# Patient Record
Sex: FEMALE | Race: BLACK OR AFRICAN AMERICAN | Employment: FULL TIME | URBAN - METROPOLITAN AREA
[De-identification: names, ages, dates, MRNs, and addresses within clinical notes are randomized per-mention and may not be internally consistent; named-entity substitution may affect disease eponyms.]

---

## 2017-01-24 ENCOUNTER — OFFICE VISIT (OUTPATIENT)
Dept: NEUROLOGY | Age: 52
End: 2017-01-24

## 2017-01-24 VITALS
OXYGEN SATURATION: 98 % | WEIGHT: 230 LBS | SYSTOLIC BLOOD PRESSURE: 130 MMHG | BODY MASS INDEX: 40.75 KG/M2 | HEART RATE: 82 BPM | HEIGHT: 63 IN | DIASTOLIC BLOOD PRESSURE: 70 MMHG

## 2017-01-24 DIAGNOSIS — G43.009 MIGRAINE WITHOUT AURA AND WITHOUT STATUS MIGRAINOSUS, NOT INTRACTABLE: Primary | ICD-10-CM

## 2017-01-24 RX ORDER — PROCHLORPERAZINE MALEATE 10 MG
TABLET ORAL
Refills: 3 | COMMUNITY
Start: 2017-01-03

## 2017-01-24 RX ORDER — SUMATRIPTAN 100 MG/1
100 TABLET, FILM COATED ORAL
Qty: 9 TAB | Refills: 11 | Status: SHIPPED | OUTPATIENT
Start: 2017-01-24

## 2017-01-24 RX ORDER — DEXAMETHASONE SODIUM PHOSPHATE 10 MG/ML
INJECTION INTRAMUSCULAR; INTRAVENOUS
COMMUNITY

## 2017-01-24 RX ORDER — FOSAPREPITANT 150 MG/5ML
INJECTION, POWDER, LYOPHILIZED, FOR SOLUTION INTRAVENOUS ONCE
COMMUNITY

## 2017-01-24 NOTE — MR AVS SNAPSHOT
Visit Information Date & Time Provider Department Dept. Phone Encounter #  
 1/24/2017 10:30 AM Bryce Andrea NP [facility] [phone] [id number] Follow-up Instructions Return in about 1 year (around 1/24/2018). Upcoming Health Maintenance Date Due  
 PAP AKA CERVICAL CYTOLOGY 1/10/2015 INFLUENZA AGE 9 TO ADULT 8/1/2016 BREAST CANCER SCRN MAMMOGRAM 11/9/2016 FOBT Q 1 YEAR AGE 50-75 11/9/2016 DTaP/Tdap/Td series (2 - Td) 1/25/2022 Allergies as of 1/24/2017  Review Complete On: 1/24/2017 By: Nick Beard LPN No Known Allergies Current Immunizations  Reviewed on 1/25/2012 Name Date Hepatitis B Vaccine 8/1/2002, 2/1/2002, 1/1/2002 Influenza Vaccine Whole 10/1/2011 TDAP Vaccine 1/25/2012 Not reviewed this visit You Were Diagnosed With   
  
 Codes Comments Migraine without aura and without status migrainosus, not intractable    -  Primary ICD-10-CM: G43.009 ICD-9-CM: 346.10 Vitals BP Pulse Height(growth percentile) Weight(growth percentile) SpO2 BMI  
 130/70 82 5' 3\" (1.6 m) 230 lb (104.3 kg) 98% 40.74 kg/m2 OB Status Smoking Status Having regular periods Never Smoker Vitals History BMI and BSA Data Body Mass Index Body Surface Area 40.74 kg/m 2 2.15 m 2 Preferred Pharmacy Pharmacy Name Phone [phone] [facility] [phone] Your Updated Medication List  
  
   
This list is accurate as of: 1/24/17 10:56 AM.  Always use your most recent med list.  
  
  
  
  
 BEVACIZUMAB IV  
by IntraVENous route. dexamethasone (PF) 10 mg/mL injection Commonly known as:  DECADRON  
by IntraMUSCular route. FLUOROURACIL IV  
by IntraVENous route. fosaprepitant 150 mg Solr injection Commonly known as:  EMEND  
by IntraVENous route once. LEUCOVORIN CALCIUM PO Take  by mouth.  
  
 palonosetron (ALOXI) IVPB  
0.25 mg by IntraVENous route once. prochlorperazine 10 mg tablet Commonly known as:  COMPAZINE  
TAKE 1 TABLET BY MOUTH EVERY 4 TO 6 HRS AS NEEDED FOR NAUSEA  
  
 SUMAtriptan 100 mg tablet Commonly known as:  IMITREX Take 1 Tab by mouth once as needed for Migraine. topiramate 50 mg tablet Commonly known as:  TOPAMAX Take 50 mg by mouth every eight (8) hours. Prescriptions Sent to Pharmacy Refills SUMAtriptan (IMITREX) 100 mg tablet 11 Sig: Take 1 Tab by mouth once as needed for Migraine. Class: Normal  
 Pharmacy: [address] #: [phone] Route: Oral  
  
Follow-up Instructions Return in about 1 year (around 1/24/2018). Patient Instructions PRESCRIPTION REFILL POLICY [facility] Statement to Patients April 1, 2014 In an effort to ensure the large volume of patient prescription refills is processed in the most efficient and expeditious manner, we are asking our patients to assist us by calling your Pharmacy for all prescription refills, this will include also your  Mail Order Pharmacy. The pharmacy will contact our office electronically to continue the refill process. Please do not wait until the last minute to call your pharmacy. We need at least 48 hours (2days) to fill prescriptions. We also encourage you to call your pharmacy before going to  your prescription to make sure it is ready. With regard to controlled substance prescription refill requests (narcotic refills) that need to be picked up at our office, we ask your cooperation by providing us with at least 72 hours (3days) notice that you will need a refill.  
 
We will not refill narcotic prescription refill requests after 4:00pm on any weekday, Monday through Thursday, or after 2:00pm on Fridays, or on the weekends. We encourage everyone to explore another way of getting your prescription refill request processed using Salesconx, our patient web portal through our electronic medical record system. STEERadst is an efficient and effective way to communicate your medication request directly to the office and  downloadable as an khushbu on your smart phone . Salesconx also features a review functionality that allows you to view your medication list as well as leave messages for your physician. Are you ready to get connected? If so please review the attatched instructions or speak to any of our staff to get you set up right away! Thank you so much for your cooperation. Should you have any questions please contact our Practice Administrator. The Physicians and Staff,  [facility] A Healthy Lifestyle: Care Instructions Your Care Instructions A healthy lifestyle can help you feel good, stay at a healthy weight, and have plenty of energy for both work and play. A healthy lifestyle is something you can share with your whole family. A healthy lifestyle also can lower your risk for serious health problems, such as high blood pressure, heart disease, and diabetes. You can follow a few steps listed below to improve your health and the health of your family. Follow-up care is a key part of your treatment and safety. Be sure to make and go to all appointments, and call your doctor if you are having problems. Its also a good idea to know your test results and keep a list of the medicines you take. How can you care for yourself at home? · Do not eat too much sugar, fat, or fast foods. You can still have dessert and treats now and then. The goal is moderation. · Start small to improve your eating habits. Pay attention to portion sizes, drink less juice and soda pop, and eat more fruits and vegetables. ¨ Eat a healthy amount of food. A 3-ounce serving of meat, for example, is about the size of a deck of cards. Fill the rest of your plate with vegetables and whole grains. ¨ Limit the amount of soda and sports drinks you have every day. Drink more water when you are thirsty. ¨ Eat at least 5 servings of fruits and vegetables every day. It may seem like a lot, but it is not hard to reach this goal. A serving or helping is 1 piece of fruit, 1 cup of vegetables, or 2 cups of leafy, raw vegetables. Have an apple or some carrot sticks as an afternoon snack instead of a candy bar. Try to have fruits and/or vegetables at every meal. 
· Make exercise part of your daily routine. You may want to start with simple activities, such as walking, bicycling, or slow swimming. Try to be active 30 to 60 minutes every day. You do not need to do all 30 to 60 minutes all at once. For example, you can exercise 3 times a day for 10 or 20 minutes. Moderate exercise is safe for most people, but it is always a good idea to talk to your doctor before starting an exercise program. 
· Keep moving. [place] the lawn, work in the garden, or FiveRuns. Take the stairs instead of the elevator at work. · If you smoke, quit. People who smoke have an increased risk for heart attack, stroke, cancer, and other lung illnesses. Quitting is hard, but there are ways to boost your chance of quitting tobacco for good. ¨ Use nicotine gum, patches, or lozenges. ¨ Ask your doctor about stop-smoking programs and medicines. ¨ Keep trying. In addition to reducing your risk of diseases in the future, you will notice some benefits soon after you stop using tobacco. If you have shortness of breath or asthma symptoms, they will likely get better within a few weeks after you quit. · Limit how much alcohol you drink. Moderate amounts of alcohol (up to 2 drinks a day for men, 1 drink a day for women) are okay.  But drinking too much can lead to liver problems, high blood pressure, and other health problems. Family health If you have a family, there are many things you can do together to improve your health. · Eat meals together as a family as often as possible. · Eat healthy foods. This includes fruits, vegetables, lean meats and dairy, and whole grains. · Include your family in your fitness plan. Most people think of activities such as jogging or tennis as the way to fitness, but there are many ways you and your family can be more active. Anything that makes you breathe hard and gets your heart pumping is exercise. Here are some tips: 
¨ Walk to do errands or to take your child to school or the bus. ¨ Go for a family bike ride after dinner instead of watching TV. Where can you learn more? Go to http://laz-jenna.info/. Enter I936 in the search box to learn more about \"A Healthy Lifestyle: Care Instructions. \" Current as of: July 26, 2016 Content Version: 11.1 © 4460-3156 [employer]. Care instructions adapted under license by [employer] (which disclaims liability or warranty for this information). If you have questions about a medical condition or this instruction, always ask your healthcare professional. [place] [address] any warranty or liability for your use of this information. Introducing [facility]! [facility] introduces Define My Style patient portal. Now you can access parts of your medical record, email your doctor's office, and request medication refills online. 1. In your internet browser, go to [email or web address] 2. Click on the First Time User? Click Here link in the Sign In box. You will see the New Member Sign Up page. 3. Enter your Define My Style Access Code exactly as it appears below. You will not need to use this code after youve completed the sign-up process.  If you do not sign up before the expiration date, you must request a new code. · Language Cloud Access Code: [email or web address] Expires: 4/24/2017 10:13 AM 
 
4. Enter the last four digits of your Social Security Number (xxxx) and Date of Birth (mm/dd/yyyy) as indicated and click Submit. You will be taken to the next sign-up page. 5. Create a Language Cloud ID. This will be your Language Cloud login ID and cannot be changed, so think of one that is secure and easy to remember. 6. Create a Language Cloud password. You can change your password at any time. 7. Enter your Password Reset Question and Answer. This can be used at a later time if you forget your password. 8. Enter your e-mail address. You will receive e-mail notification when new information is available in [address] 9. Click Sign Up. You can now view and download portions of your medical record. 10. Click the Download Summary menu link to download a portable copy of your medical information. If you have questions, please visit the Frequently Asked Questions section of the Language Cloud website. Remember, Language Cloud is NOT to be used for urgent needs. For medical emergencies, dial 911. Now available from your iPhone and Android! Please provide this summary of care documentation to your next provider. Your primary care clinician is listed as [place]. If you have any questions after today's visit, please call [phone].

## 2017-01-24 NOTE — PROGRESS NOTES
Date:            2017    Name:  Zulma Way  :  1965  MRN:  [id number]     PCP:  Chemo Abraham MD    Chief Complaint   Patient presents with    Follow-up    Migraine         HISTORY OF PRESENT ILLNESS:  Suleiman Escalante is a 49 y.o., female who presents today for follow up for migraines. She is a very typical monthly migraine pattern related to her menstrual cycles. She only has migraines a few days prior to her cycle and then another cluster 5-7 days after her cycle. This second cluster will last about 3 days. Never has headaches outside of that pattern. She does not pre-medicate with Imitrex or Aleve. She gets 100 mg imitrex but only take 25 mg at a time. She has tried and failed propranolol and Topamax for preventative therapy. She is currently getting treatment for colon cancer, and is not interested in trying any preventative therapy for her migraines because of all the medications that she is on for that. . Noticed that her Hgb was low at 10.6, her PCP sent her to see Dr Rachel Palomino who started her on iron supplements and got an iron infusion and felt better, but went ahead and contacted her PCP to get a colonoscopy. She had also noticed that she was getting SOB cleaning her house. By the time was discovered that she had colon cancer, she had liver metastases, so she is stage IV. Just started chemo a few weeks ago. 2014 recap  Suleiman Escalante is a 53 y.o. female who I am asked to see in consultation for headaches. She sees Dr. Jose Valle and is here for a second opinion. She was put on topamax. She was told to take 50mg three times per day. She had too much memory loss so now she only takes 50mg at night. At higher doses she also had severe right ovarian pain but this has improved with the decrease in the dose. She also got N/V with it. She also takes imitrex for abortive.     Her migraines start with tension in her shoulder and then goes up to her head.  Always on her left side. Imitrex will stop it. She has no photo/phonophobia. Sometimes nausea. Pain is throbbing and pounding. She gets them three days in a row every month around her period. She will occasionally get them other times of the month too. She will also have the same cycle of three days of migraine during these times too. 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  dexamethasone, PF, (DECADRON) 10 mg/mL injection by IntraMUSCular route.  fosaprepitant (EMEND) 150 mg solr injection by IntraVENous route once.  BEVACIZUMAB IV by IntraVENous route.  LEUCOVORIN CALCIUM PO Take  by mouth.  FLUOROURACIL IV by IntraVENous route.  propranolol LA (INDERAL LA) 80 mg SR capsule TAKE 1 CAPSULE BY MOUTH DAILY    topiramate (TOPAMAX) 50 mg tablet Take 50 mg by mouth every eight (8) hours.  SUMAtriptan (IMITREX) 100 mg tablet Take 100 mg by mouth once as needed for Migraine. No current facility-administered medications for this visit. No Known Allergies  Past Medical History   Diagnosis Date    Headache(784.0)     Joint pain      Past Surgical History   Procedure Laterality Date    Hx tubal ligation      Pr excis bartholin gland/cyst       Social History     Social History    Marital status: SINGLE     Spouse name: N/A    Number of children: N/A    Years of education: N/A     Occupational History    Not on file.      Social History Main Topics    Smoking status: Never Smoker    Smokeless tobacco: Never Used    Alcohol use Yes      Comment: occasionally    Drug use: No    Sexual activity: Yes     Other Topics Concern    Not on file     Social History Narrative     Family History   Problem Relation Age of Onset    Hypertension Mother     Diabetes Paternal Grandmother          PHYSICAL EXAMINATION:    Visit Vitals    /70    Pulse 82    Ht 5' 3\" (1.6 m)    Wt 230 lb (104.3 kg)    SpO2 98%    BMI 40.74 kg/m2     General:  Well defined, nourished, and groomed individual in no acute distress. Neck: Supple, nontender, no bruits, no pain with resistance to active range of motion. Heart: Regular rate and rhythm, no murmurs, rub, or gallop. Normal S1S2. Lungs:  Clear to auscultation bilaterally with equal chest expansion, no cough, no wheeze  Musculoskeletal:  Extremities revealed no edema and had full range of motion of joints. Psych:  Good mood and bright affect    NEUROLOGICAL EXAMINATION:     Mental Status:   Alert and oriented to person, place, and time with recent and remote memory intact. Attention span and concentration are normal. Speech is fluent with a full fund of knowledge. Cranial Nerves:    II, III, IV, VI:  Visual acuity grossly intact. Pupils are equal, round, and reactive to light. Extra-ocular movements are full and fluid. No ptosis or nystagmus. V-XII: Hearing is grossly intact. Facial features are symmetric, with normal sensation and strength. The palate rises symmetrically and the tongue protrudes midline. Sternocleidomastoids 5/5. Motor Examination: Normal tone, bulk, and strength, 5/5 muscle strength throughout. Coordination:  Finger to nose testing was normal.   No resting or intention tremor  Gait and Station:  Steady while walking and with tandem walking. Normal arm swing. No pronator drift. No muscle wasting or fasciculations noted.         ASSESSMENT AND PLAN ICD-10-CM ICD-9-CM    1. Migraine without aura and without status migrainosus, not intractable G43.009 346.10 SUMAtriptan (IMITREX) 100 mg tablet     60-year-old female seen in follow-up for migraines. She is a very typical pattern and her headaches rarely straight outside of that. She will get a headache for about 2 days prior to her menstrual cycle, and another last about 3 days and occurs 5-7 days after her cycle. Has tried and failed propranolol and Topamax for preventative therapy. Is not interested in any other preventative at this time as she is undergoing chemotherapy for stage IV colon cancer. Uses 25 mg sumatriptan as needed for abortive therapy. 1.  Continue sumatriptan  mg as needed for abortive therapy, advised patient that she can also add naproxen to that dose to help keep her headache from returning  2. Continue conservative measures of headache prevention, patient would like to pursue yoga classes available here and was provided with information on that  3. Discussed that chemotherapy may throw her into early menopause, and she may see an improvement of her migraines due to that.   Can return to discuss addition of more preventative therapy at any time if she wishes    Follow-up in 1 year, call sooner with concerns    Reza Arce NP

## 2017-01-24 NOTE — PATIENT INSTRUCTIONS
[facility]   Statement to Patients  April 1, 2014      In an effort to ensure the large volume of patient prescription refills is processed in the most efficient and expeditious manner, we are asking our patients to assist us by calling your Pharmacy for all prescription refills, this will include also your  Mail Order Pharmacy. The pharmacy will contact our office electronically to continue the refill process. Please do not wait until the last minute to call your pharmacy. We need at least 48 hours (2days) to fill prescriptions. We also encourage you to call your pharmacy before going to  your prescription to make sure it is ready. With regard to controlled substance prescription refill requests (narcotic refills) that need to be picked up at our office, we ask your cooperation by providing us with at least 72 hours (3days) notice that you will need a refill. We will not refill narcotic prescription refill requests after 4:00pm on any weekday, Monday through Thursday, or after 2:00pm on Fridays, or on the weekends. We encourage everyone to explore another way of getting your prescription refill request processed using Festicket, our patient web portal through our electronic medical record system. Festicket is an efficient and effective way to communicate your medication request directly to the office and  downloadable as an khushbu on your smart phone . Festicket also features a review functionality that allows you to view your medication list as well as leave messages for your physician. Are you ready to get connected? If so please review the attatched instructions or speak to any of our staff to get you set up right away! Thank you so much for your cooperation. Should you have any questions please contact our Practice Administrator.     The Physicians and Staff,  [facility]          A Healthy Lifestyle: Care Instructions  Your Care Instructions  A healthy lifestyle can help you feel good, stay at a healthy weight, and have plenty of energy for both work and play. A healthy lifestyle is something you can share with your whole family. A healthy lifestyle also can lower your risk for serious health problems, such as high blood pressure, heart disease, and diabetes. You can follow a few steps listed below to improve your health and the health of your family. Follow-up care is a key part of your treatment and safety. Be sure to make and go to all appointments, and call your doctor if you are having problems. Its also a good idea to know your test results and keep a list of the medicines you take. How can you care for yourself at home? · Do not eat too much sugar, fat, or fast foods. You can still have dessert and treats now and then. The goal is moderation. · Start small to improve your eating habits. Pay attention to portion sizes, drink less juice and soda pop, and eat more fruits and vegetables. ¨ Eat a healthy amount of food. A 3-ounce serving of meat, for example, is about the size of a deck of cards. Fill the rest of your plate with vegetables and whole grains. ¨ Limit the amount of soda and sports drinks you have every day. Drink more water when you are thirsty. ¨ Eat at least 5 servings of fruits and vegetables every day. It may seem like a lot, but it is not hard to reach this goal. A serving or helping is 1 piece of fruit, 1 cup of vegetables, or 2 cups of leafy, raw vegetables. Have an apple or some carrot sticks as an afternoon snack instead of a candy bar. Try to have fruits and/or vegetables at every meal.  · Make exercise part of your daily routine. You may want to start with simple activities, such as walking, bicycling, or slow swimming. Try to be active 30 to 60 minutes every day. You do not need to do all 30 to 60 minutes all at once. For example, you can exercise 3 times a day for 10 or 20 minutes.  Moderate exercise is safe for most people, but it is always a good idea to talk to your doctor before starting an exercise program.  · Keep moving. [place] the lawn, work in the garden, or Ave[employer]. Take the stairs instead of the elevator at work. · If you smoke, quit. People who smoke have an increased risk for heart attack, stroke, cancer, and other lung illnesses. Quitting is hard, but there are ways to boost your chance of quitting tobacco for good. ¨ Use nicotine gum, patches, or lozenges. ¨ Ask your doctor about stop-smoking programs and medicines. ¨ Keep trying. In addition to reducing your risk of diseases in the future, you will notice some benefits soon after you stop using tobacco. If you have shortness of breath or asthma symptoms, they will likely get better within a few weeks after you quit. · Limit how much alcohol you drink. Moderate amounts of alcohol (up to 2 drinks a day for men, 1 drink a day for women) are okay. But drinking too much can lead to liver problems, high blood pressure, and other health problems. Family health  If you have a family, there are many things you can do together to improve your health. · Eat meals together as a family as often as possible. · Eat healthy foods. This includes fruits, vegetables, lean meats and dairy, and whole grains. · Include your family in your fitness plan. Most people think of activities such as jogging or tennis as the way to fitness, but there are many ways you and your family can be more active. Anything that makes you breathe hard and gets your heart pumping is exercise. Here are some tips:  ¨ Walk to do errands or to take your child to school or the bus. ¨ Go for a family bike ride after dinner instead of watching TV. Where can you learn more? Go to http://laz-jenna.info/. Enter S908 in the search box to learn more about \"A Healthy Lifestyle: Care Instructions. \"  Current as of: July 26, 2016  Content Version: 11.1  © 1629-4450 [employer]. Care instructions adapted under license by [employer] (which disclaims liability or warranty for this information). If you have questions about a medical condition or this instruction, always ask your healthcare professional. [address] any warranty or liability for your use of this information.

## 2017-10-04 ENCOUNTER — HOSPITAL ENCOUNTER (OUTPATIENT)
Dept: MAMMOGRAPHY | Age: 52
Discharge: HOME | End: 2017-10-04
Attending: INTERNAL MEDICINE
Payer: COMMERCIAL

## 2017-10-04 DIAGNOSIS — C18.9 COLON CANCER (HCC): ICD-10-CM

## 2017-10-04 DIAGNOSIS — R92.2 BREAST DENSITY: ICD-10-CM

## 2017-10-04 PROCEDURE — 77066 DX MAMMO INCL CAD BI: CPT
